# Patient Record
(demographics unavailable — no encounter records)

---

## 2024-12-02 NOTE — HISTORY OF PRESENT ILLNESS
[FreeTextEntry1] : Santo is a 51-year-old male who presents today with acute right middle finger pain after suffering a direct contact injury on 11/30/2024 where his finger was slammed into a car door.  Subsequent to this he was seen in the emergency department.  The symptoms are bothersome and are affecting his ADLs.

## 2024-12-02 NOTE — PHYSICAL EXAM
[de-identified] : On examination of the right middle finger particularly at the level of the PIP joint, there is moderate tenderness and swelling however no signs of infection. Active flexion and extension of both the PIP and DIP joints on isolation are intact. There is stiffness when attempting to make a full composite fist with some discomfort  [de-identified] : [4] views of [bilateral hands and wrists] were obtained today in my office and were seen by me and discussed with the patient.  These [show findings consistent with concern for right middle finger P2 base avulsion fracture?]

## 2024-12-02 NOTE — ASSESSMENT
[FreeTextEntry1] : ASSESSMENT: The patient comes in today with acute right middle finger pain after suffering a direct contact injury on 11/30/2024 where his finger was slammed into a car door complicated by concern for right middle finger P2 base avulsion fracture versus right middle finger PIP joint sprain.  Treatment modalities were discussed, and the patient elects to proceed with nonoperative modalities at this time. We have discussed proper immobilization, nonweightbearing, activity modifications, oral anti-inflammatory medication as needed.  We will continue to follow. In general with any concern for fracture we have again discussed the risks of displacement, malunion, nonunion, chronic pain, and chronic deformity both with and without surgery, patient verbalizes understanding.   The patient was adequately and thoroughly informed of my assessment of their current condition(s).  - This may diminish bodily function for the extremity.  We discussed prognosis, treatment modalities including operative and nonoperative options for the above diagnostic assessment. As always, 2nd opinion is always provided as an option. For this, when accessible, I was able to review other physicians note(s) including reviewing other tests, imaging results as well as personally view these results for my own interpretation.      The patient was adequately and thoroughly informed of my assessment of their current condition(s).   DISCUSSION: 1.  Commence proper immobilization, nonweightbearing, activity modifications. 2.  Follow-up in 1 week for repeat x-rays of the right middle finger. 3. [x]

## 2024-12-11 NOTE — ASSESSMENT
[FreeTextEntry1] : ASSESSMENT: The patient comes in today with acute right middle finger pain after suffering a direct contact injury on 11/30/2024 where his finger was slammed into a car door complicated by concern for right middle finger P2 base avulsion fracture versus right middle finger PIP joint sprain.  Treatment modalities were discussed, and the patient elects to proceed with nonoperative modalities at this time. We have discussed proper immobilization, nonweightbearing, activity modifications, oral anti-inflammatory medication as needed.  We will continue to follow. In general with any concern for fracture we have again discussed the risks of displacement, malunion, nonunion, chronic pain, and chronic deformity both with and without surgery, patient verbalizes understanding.   The patient was adequately and thoroughly informed of my assessment of their current condition(s).  - This may diminish bodily function for the extremity.  We discussed prognosis, treatment modalities including operative and nonoperative options for the above diagnostic assessment. As always, 2nd opinion is always provided as an option. For this, when accessible, I was able to review other physicians note(s) including reviewing other tests, imaging results as well as personally view these results for my own interpretation.      The patient was adequately and thoroughly informed of my assessment of their current condition(s).  A prescription for meloxicam was given today. The patient was instructed to take this with food and discontinue other NSAIDs while taking this. The risks, benefits and black box warnings were discussed. The patient was instructed to discontinue the medication if it began hurting their stomach.   DISCUSSION: 1.  Continue proper immobilization, nonweightbearing, activity modifications. Meloxicam as above. 2.  Follow-up in 1 week for repeat x-rays of the right middle finger. 3.  Consider OT next visit.

## 2024-12-11 NOTE — PHYSICAL EXAM
[de-identified] : On examination of the right middle finger particularly at the level of the PIP joint, there is moderate tenderness and swelling however no signs of infection. Active flexion and extension of both the PIP and DIP joints on isolation are intact. There is stiffness when attempting to make a full composite fist with some discomfort  [de-identified] : 3 views of right middle finger were obtained today in my office and were seen by me and discussed with the patient.  These [show findings consistent with concern for right middle finger P2 base avulsion fracture?]

## 2024-12-11 NOTE — HISTORY OF PRESENT ILLNESS
[FreeTextEntry1] : Santo is a 51-year-old male who presents today with acute right middle finger pain after suffering a direct contact injury on 11/30/2024 where his finger was slammed into a car door.  Subsequent to this he was seen in the emergency department.  The symptoms are bothersome and are affecting his ADLs. His pain is well controlled today.